# Patient Record
Sex: MALE | Race: WHITE | NOT HISPANIC OR LATINO | Employment: OTHER | URBAN - METROPOLITAN AREA
[De-identification: names, ages, dates, MRNs, and addresses within clinical notes are randomized per-mention and may not be internally consistent; named-entity substitution may affect disease eponyms.]

---

## 2017-12-30 ENCOUNTER — HOSPITAL ENCOUNTER (EMERGENCY)
Facility: MEDICAL CENTER | Age: 27
End: 2017-12-30
Attending: EMERGENCY MEDICINE
Payer: MEDICARE

## 2017-12-30 VITALS
WEIGHT: 251.32 LBS | RESPIRATION RATE: 16 BRPM | OXYGEN SATURATION: 90 % | SYSTOLIC BLOOD PRESSURE: 155 MMHG | HEART RATE: 89 BPM | TEMPERATURE: 97.2 F | BODY MASS INDEX: 30.6 KG/M2 | DIASTOLIC BLOOD PRESSURE: 97 MMHG | HEIGHT: 76 IN

## 2017-12-30 DIAGNOSIS — G43.909 MIGRAINE WITHOUT STATUS MIGRAINOSUS, NOT INTRACTABLE, UNSPECIFIED MIGRAINE TYPE: ICD-10-CM

## 2017-12-30 PROCEDURE — 99284 EMERGENCY DEPT VISIT MOD MDM: CPT

## 2017-12-30 PROCEDURE — 36415 COLL VENOUS BLD VENIPUNCTURE: CPT

## 2017-12-30 PROCEDURE — 96374 THER/PROPH/DIAG INJ IV PUSH: CPT

## 2017-12-30 PROCEDURE — 700105 HCHG RX REV CODE 258: Performed by: EMERGENCY MEDICINE

## 2017-12-30 PROCEDURE — 96375 TX/PRO/DX INJ NEW DRUG ADDON: CPT

## 2017-12-30 PROCEDURE — 700111 HCHG RX REV CODE 636 W/ 250 OVERRIDE (IP): Performed by: EMERGENCY MEDICINE

## 2017-12-30 RX ORDER — KETOROLAC TROMETHAMINE 30 MG/ML
30 INJECTION, SOLUTION INTRAMUSCULAR; INTRAVENOUS ONCE
Status: COMPLETED | OUTPATIENT
Start: 2017-12-30 | End: 2017-12-30

## 2017-12-30 RX ORDER — SODIUM CHLORIDE 9 MG/ML
1000 INJECTION, SOLUTION INTRAVENOUS ONCE
Status: COMPLETED | OUTPATIENT
Start: 2017-12-30 | End: 2017-12-30

## 2017-12-30 RX ORDER — DIPHENHYDRAMINE HYDROCHLORIDE 50 MG/ML
50 INJECTION INTRAMUSCULAR; INTRAVENOUS ONCE
Status: COMPLETED | OUTPATIENT
Start: 2017-12-30 | End: 2017-12-30

## 2017-12-30 RX ORDER — METOCLOPRAMIDE HYDROCHLORIDE 5 MG/ML
10 INJECTION INTRAMUSCULAR; INTRAVENOUS ONCE
Status: COMPLETED | OUTPATIENT
Start: 2017-12-30 | End: 2017-12-30

## 2017-12-30 RX ADMIN — METOCLOPRAMIDE 10 MG: 5 INJECTION, SOLUTION INTRAMUSCULAR; INTRAVENOUS at 07:02

## 2017-12-30 RX ADMIN — DIPHENHYDRAMINE HYDROCHLORIDE 50 MG: 50 INJECTION, SOLUTION INTRAMUSCULAR; INTRAVENOUS at 07:04

## 2017-12-30 RX ADMIN — KETOROLAC TROMETHAMINE 30 MG: 30 INJECTION, SOLUTION INTRAMUSCULAR at 07:05

## 2017-12-30 RX ADMIN — SODIUM CHLORIDE 1000 ML: 9 INJECTION, SOLUTION INTRAVENOUS at 07:02

## 2017-12-30 ASSESSMENT — LIFESTYLE VARIABLES: DO YOU DRINK ALCOHOL: NO

## 2017-12-30 ASSESSMENT — PAIN SCALES - GENERAL: PAINLEVEL_OUTOF10: 2

## 2017-12-30 NOTE — ED NOTES
Pt states understanding of dc instructions and f/u care. Pt able to amb out w/ steady gait. Going home with family.

## 2017-12-30 NOTE — ED NOTES
"Pt c/o migraine that began yesterday. Pt is sensitive to light and noise. Pt has hx of migraine weekly and states that he gets a severe migraine once every 4 weeks. Pt takes cyclobenzaprine and Buspar PRN for migraines, no relief.     Chief Complaint   Patient presents with   • Migraine     /97   Pulse (!) 108   Temp 36.2 °C (97.2 °F)   Resp 18   Ht 1.93 m (6' 4\") Comment: Simultaneous filing. User may not have seen previous data.  Wt 114 kg (251 lb 5.2 oz)   SpO2 93%   BMI 30.59 kg/m²     Pt placed in lobby, updated on triage process. Pt educated to notified RN or triage tech if changes in condition.     "

## 2017-12-30 NOTE — ED PROVIDER NOTES
"ED Provider Note    CHIEF COMPLAINT  Chief Complaint   Patient presents with   • Migraine       HPI  Kirill De La Rosa is a 27 y.o. male who presents with headache described as bilateral, no lasting over the course of 2 days.  He states he has approximately 4 that headaches a month with multiple small ones in between.  He has seen multiple neurologists and tried multiple medications for his headache.  He is visiting from [place].  He states usually given a \"cocktail\", describing Toradol Reglan and Benadryl as the medications.  He also states IV fluids help.  No acute numbness or weakness.  He denies symptoms out of the ordinary from his normal migraine headaches    REVIEW OF SYSTEMS  Neurologic: Headache  Eyes: Photophobia  Ear nose throat: No facial pain  Gastrointestinal: Nausea without vomiting  Musculoskeletal: No acute neck pain        PAST MEDICAL HISTORY  No past medical history on file.    FAMILY HISTORY  No family history on file.    SOCIAL HISTORY  Social History     Social History   • Marital status: Single     Spouse name: N/A   • Number of children: N/A   • Years of education: N/A     Social History Main Topics   • Smoking status: Not on file   • Smokeless tobacco: Not on file   • Alcohol use Not on file   • Drug use: Unknown   • Sexual activity: Not on file     Other Topics Concern   • Not on file     Social History Narrative   • No narrative on file       SURGICAL HISTORY  No past surgical history on file.    CURRENT MEDICATIONS  No current facility-administered medications on file prior to encounter.      No current outpatient prescriptions on file prior to encounter.       ALLERGIES  Allergies   Allergen Reactions   • Meloxicam      weakness       PHYSICAL EXAM  VITAL SIGNS: /97   Pulse 89   Temp 36.2 °C (97.2 °F)   Resp 16   Ht 1.93 m (6' 4\") Comment: Simultaneous filing. User may not have seen previous data.  Wt 114 kg (251 lb 5.2 oz)   SpO2 90%   BMI 30.59 kg/m²   Constitutional:  No " acute distress, Non-toxic appearance.   HENT:No facial swelling or tenderness  Eyes: PERRLA, EOMI,Pupils are 3 mm bilateral Conjunctiva normal, No discharge.   Musculoskeletal: No cervical neck tenderness, neck is supple.   Cardiovascular: Normal heart rate, Normal rhythm.   Pulmonary: Normal breath sounds, No respiratory distress, No wheezing  Skin: Warm, Dry, No erythema, No rash.    Neurologic:The patient and strength intact.  Speech clear.  Facial grimace symmetric.  Psychiatric: Affect normal, Mood normal.       COURSE & MEDICAL DECISION MAKING  Pertinent Labs & Imaging studies reviewed. (See chart for details)  Patient state he felt better after Toradol, Reglan, and Benadryl with IV fluids.  IV fluids were indicated for the help with treatment of intractable migraine headache.  As this is a chronic condition for the patient which happens at least 4 times a month, I find no evidence of stroke symptoms there is no evidence of meningitis, the patient is medically clear for discharge.  He states he has his own medications as well as neurologist follow-up with.  He has agreed to return if worse or for any concerns.    Final impression.    1. Migraine without status migrainosus, not intractable, unspecified migraine type      Electronically signed by: Joey Andrade, 12/30/2017 3:31 PM